# Patient Record
Sex: MALE | Race: WHITE | NOT HISPANIC OR LATINO | URBAN - METROPOLITAN AREA
[De-identification: names, ages, dates, MRNs, and addresses within clinical notes are randomized per-mention and may not be internally consistent; named-entity substitution may affect disease eponyms.]

---

## 2017-01-27 ENCOUNTER — INPATIENT (INPATIENT)
Facility: HOSPITAL | Age: 77
LOS: 9 days | Discharge: HOME HEALTH | End: 2017-02-06
Attending: ORTHOPAEDIC SURGERY

## 2017-06-27 DIAGNOSIS — T84.54XD INFECTION AND INFLAMMATORY REACTION DUE TO INTERNAL LEFT KNEE PROSTHESIS, SUBSEQUENT ENCOUNTER: ICD-10-CM

## 2017-06-27 DIAGNOSIS — Z96.643 PRESENCE OF ARTIFICIAL HIP JOINT, BILATERAL: ICD-10-CM
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
## 2017-06-27 DIAGNOSIS — N40.0 BENIGN PROSTATIC HYPERPLASIA WITHOUT LOWER URINARY TRACT SYMPTOMS: ICD-10-CM

## 2017-06-27 DIAGNOSIS — I48.91 UNSPECIFIED ATRIAL FIBRILLATION: ICD-10-CM

## 2017-06-27 DIAGNOSIS — G47.33 OBSTRUCTIVE SLEEP APNEA (ADULT) (PEDIATRIC): ICD-10-CM

## 2017-06-27 DIAGNOSIS — I10 ESSENTIAL (PRIMARY) HYPERTENSION: ICD-10-CM

## 2018-04-10 ENCOUNTER — RX RENEWAL (OUTPATIENT)
Age: 78
End: 2018-04-10

## 2018-07-20 ENCOUNTER — APPOINTMENT (OUTPATIENT)
Dept: UROLOGY | Facility: CLINIC | Age: 78
End: 2018-07-20
Payer: MEDICARE

## 2018-07-20 VITALS — SYSTOLIC BLOOD PRESSURE: 154 MMHG | DIASTOLIC BLOOD PRESSURE: 84 MMHG | HEART RATE: 76 BPM

## 2018-07-20 DIAGNOSIS — Z78.9 OTHER SPECIFIED HEALTH STATUS: ICD-10-CM

## 2018-07-20 LAB
BILIRUB UR QL STRIP: NORMAL
CLARITY UR: CLEAR
COLLECTION METHOD: NORMAL
GLUCOSE UR-MCNC: NORMAL
HCG UR QL: NORMAL EU/DL
HGB UR QL STRIP.AUTO: NORMAL
KETONES UR-MCNC: NORMAL
LEUKOCYTE ESTERASE UR QL STRIP: NORMAL
NITRITE UR QL STRIP: NORMAL
PH UR STRIP: 6
PROT UR STRIP-MCNC: NORMAL
SP GR UR STRIP: 1.01

## 2018-07-20 PROCEDURE — 81003 URINALYSIS AUTO W/O SCOPE: CPT | Mod: QW

## 2018-07-20 PROCEDURE — 99213 OFFICE O/P EST LOW 20 MIN: CPT

## 2019-01-15 ENCOUNTER — APPOINTMENT (OUTPATIENT)
Dept: PLASTIC SURGERY | Facility: CLINIC | Age: 79
End: 2019-01-15
Payer: MEDICARE

## 2019-01-15 VITALS — WEIGHT: 215 LBS | BODY MASS INDEX: 29.12 KG/M2 | HEIGHT: 72 IN

## 2019-01-15 DIAGNOSIS — M65.30 TRIGGER FINGER, UNSPECIFIED FINGER: ICD-10-CM

## 2019-01-15 DIAGNOSIS — Z87.19 PERSONAL HISTORY OF OTHER DISEASES OF THE DIGESTIVE SYSTEM: ICD-10-CM

## 2019-01-15 DIAGNOSIS — Z86.79 PERSONAL HISTORY OF OTHER DISEASES OF THE CIRCULATORY SYSTEM: ICD-10-CM

## 2019-01-15 DIAGNOSIS — M19.90 UNSPECIFIED OSTEOARTHRITIS, UNSPECIFIED SITE: ICD-10-CM

## 2019-01-15 PROCEDURE — 99203 OFFICE O/P NEW LOW 30 MIN: CPT | Mod: 25

## 2019-01-15 PROCEDURE — 20550 NJX 1 TENDON SHEATH/LIGAMENT: CPT

## 2019-01-15 NOTE — HISTORY OF PRESENT ILLNESS
[FreeTextEntry1] : 77 yo M RHD with PMH of A-Fib on Xarelto, MARGO on CPAP and multiple trigger fingers in right hand s/p release by another practitioner in [place] who presents with left thumb pain and locking for the past 6 months. Patient has been wearing a thumb spica splint with some improvement in pain but persistent locking. Denies any hand trauma. Denies any prior injection to left thumb.

## 2019-01-15 NOTE — PHYSICAL EXAM
[de-identified] : well-developed male, no distress [de-identified] : left thumb with active triggering, FROM,

## 2019-01-15 NOTE — ASSESSMENT
[FreeTextEntry1] : 79 yo M with left trigger thumb. \par \par - recommend Kenalog injection \par \par triggering left thumb--injected 5mg\par \par fili well\par \par f/u 4-6 weeks

## 2019-01-20 ENCOUNTER — FORM ENCOUNTER (OUTPATIENT)
Age: 79
End: 2019-01-20

## 2019-01-21 ENCOUNTER — OUTPATIENT (OUTPATIENT)
Dept: OUTPATIENT SERVICES | Facility: HOSPITAL | Age: 79
LOS: 1 days | Discharge: HOME | End: 2019-01-21

## 2019-01-21 DIAGNOSIS — M25.531 PAIN IN RIGHT WRIST: ICD-10-CM

## 2019-01-21 DIAGNOSIS — M25.532 PAIN IN LEFT WRIST: ICD-10-CM

## 2019-02-19 ENCOUNTER — APPOINTMENT (OUTPATIENT)
Dept: PLASTIC SURGERY | Facility: CLINIC | Age: 79
End: 2019-02-19
Payer: MEDICARE

## 2019-02-19 PROCEDURE — 99212 OFFICE O/P EST SF 10 MIN: CPT

## 2019-02-19 NOTE — ASSESSMENT
[FreeTextEntry1] : 79 yo M with left trigger thumb, resolved s/p 1 Kenalog injection Jan 2019\par -No PRS intervention at this time\par -Reviewed possible 2nd injection if triggering recurs\par \par as above\par pt doing well overall

## 2019-02-19 NOTE — HISTORY OF PRESENT ILLNESS
[FreeTextEntry1] : 79 yo M RHD with PMH of A-Fib on Xarelto, MARGO on CPAP and multiple trigger fingers (right 2nd and 5th, possible 3rd or 4th) in right hand s/p release by another practitioner in [place] who presents with left thumb pain and locking for the past 6 months. Patient has been wearing a thumb spica splint with some improvement in pain but persistent locking. Denies any hand trauma. Denies any prior injection to left thumb. \par  \par Interval hx (2/19/19): Pt presents for f/u after 1st kenalog injection to left thumb. States it stopped triggering 1 week after injection and has not recurred since. Very happy with results.

## 2019-02-19 NOTE — PHYSICAL EXAM
[de-identified] : well-developed male, no distress [de-identified] : Left thumb with palpable nontender nodule over A1 pulley, no locking or clicking, FROM

## 2019-03-06 ENCOUNTER — APPOINTMENT (OUTPATIENT)
Dept: CARDIOLOGY | Facility: CLINIC | Age: 79
End: 2019-03-06

## 2019-05-16 ENCOUNTER — APPOINTMENT (OUTPATIENT)
Dept: CARDIOLOGY | Facility: CLINIC | Age: 79
End: 2019-05-16

## 2019-06-11 ENCOUNTER — RECORD ABSTRACTING (OUTPATIENT)
Age: 79
End: 2019-06-11

## 2019-06-11 DIAGNOSIS — M25.369 OTHER INSTABILITY, UNSPECIFIED KNEE: ICD-10-CM

## 2019-06-11 DIAGNOSIS — Z87.898 PERSONAL HISTORY OF OTHER SPECIFIED CONDITIONS: ICD-10-CM

## 2019-06-11 DIAGNOSIS — I10 ESSENTIAL (PRIMARY) HYPERTENSION: ICD-10-CM

## 2019-07-11 ENCOUNTER — APPOINTMENT (OUTPATIENT)
Dept: NEUROLOGY | Facility: CLINIC | Age: 79
End: 2019-07-11
Payer: MEDICARE

## 2019-07-19 ENCOUNTER — APPOINTMENT (OUTPATIENT)
Dept: NEUROLOGY | Facility: CLINIC | Age: 79
End: 2019-07-19
Payer: MEDICARE

## 2019-07-19 VITALS
DIASTOLIC BLOOD PRESSURE: 66 MMHG | HEIGHT: 71 IN | OXYGEN SATURATION: 99 % | TEMPERATURE: 96.3 F | HEART RATE: 69 BPM | SYSTOLIC BLOOD PRESSURE: 119 MMHG | WEIGHT: 220 LBS | BODY MASS INDEX: 30.8 KG/M2

## 2019-07-19 PROCEDURE — 99214 OFFICE O/P EST MOD 30 MIN: CPT

## 2019-07-22 NOTE — ASSESSMENT
[FreeTextEntry1] : Owen is here for followup. He was seen by me first in 2011 in the context of having issues with his memory and gait. At the time he was attributing the memory issue to postsurgical changes after his knee surgery in 2009. He was also started on Vicodin at the time and is to use continue to take painkillers in that category. The striking issue at the time of initial visit buses gait but this didn't persist and the workup had confirmed the initial diagnosis of mechanical and a spinal cause. Considering the initial presentation I believe overall his maintaining level of function very reasonable level he does have significant spine disease his last MRI of the lumbar spine done in 2018 and his EMG as well.\par His memory issues had a state very much is stable and in fact he has been doing slightly better in my opinion there is no reason to believe that we are dealing with a degenerative  cognitive disease. At this point he will continue his current level of care he has a good understanding of his daily exercises. As today she is driving issues and also convenience we have discussed for him to have a [place]up [place] feet Dr. Cody. These next next appointment is made with Dr. Cody in the office. Best wishes

## 2019-07-22 NOTE — PHYSICAL EXAM
[FreeTextEntry1] : Owen is in a good mood making her very good conversation he follows for step commands crosses the midline well his language is intact I do not see significant issues retrieving from his memory he is in a good mood his thought contents are normal he is well groomed and clean.\par Cranial nerve exam is normal\par Motor exam shows that compromise and often asymmetrical weightbearing when he stands up. The motor strength is 5 over 5 his posture is scoliotic to the right with right shoulder drop and he almost limps on the left side. His gait is stable walking without cane but quite hesitant. The Romberg is negative

## 2019-07-22 NOTE — HISTORY OF PRESENT ILLNESS
[FreeTextEntry1] : Owen is here for followup. He is 79 years old first seen by me in 2011 in the context of having some issues with his memory and processing speed appropriately after starting Vicodin and after his surgery. The surgical procedure was left knee replacement in 2009.\par At the time because my impression that the history and physical exam and natural course was not suggestive of a degenerative brain disease. However the exam was significant for gait disorder that again in my assessment was not suggestive of central cause. It was my impression that it was mainly mechanical and spinal in nature.\par At the time we did do an MRI of the brain that shows minimal small vessel disease. The ambulator EEG was normal. The MRI of the spine he states his significant disease.\par His last MRI of the lumbar spine shows moderate lumbar dextral scoliosis and grade 1 L1-2 L2-3 L3-4 and L5-S1 retrolisthesis. It also shows marked diffuse degenerative changes in the lower thoracic and lumbar spine and minimal L2 she has spinal carotid stenoses and foraminal disease and moderate L3-L4 spinal canal stenoses and marked L4-L5 and spinal stenosis. This MRI was done in May 2018.\par He had done also EMGs in the past the last EMG was done in July 2018 that shows bilateral severe chronic lumbosacral radiculopathies primarily involving the L5-S1 nerve roots.\par He did receive some physical therapy and he is quite aware of the condition and with that level of understanding trying his best to be independent. Recently in 2 or 3 occasions he lost his balance mainly happening bending down and also turning suddenly. He denies having any vertigo or dizziness. His memory issues continues to be at the level that I saw him first I believe he did not change and in some aspects he is doing better. This improvement to some extent is because of getting adjusted and to learn how to leave independently for the last few years

## 2019-07-26 ENCOUNTER — APPOINTMENT (OUTPATIENT)
Dept: UROLOGY | Facility: CLINIC | Age: 79
End: 2019-07-26

## 2019-08-29 ENCOUNTER — APPOINTMENT (OUTPATIENT)
Dept: CARDIOLOGY | Facility: CLINIC | Age: 79
End: 2019-08-29
Payer: MEDICARE

## 2019-08-29 PROCEDURE — 93000 ELECTROCARDIOGRAM COMPLETE: CPT

## 2019-08-29 PROCEDURE — 99213 OFFICE O/P EST LOW 20 MIN: CPT

## 2019-10-01 ENCOUNTER — APPOINTMENT (OUTPATIENT)
Dept: UROLOGY | Facility: CLINIC | Age: 79
End: 2019-10-01
Payer: MEDICARE

## 2019-10-01 VITALS — WEIGHT: 220 LBS | BODY MASS INDEX: 30.8 KG/M2 | HEIGHT: 71 IN

## 2019-10-01 DIAGNOSIS — N13.8 BENIGN PROSTATIC HYPERPLASIA WITH LOWER URINARY TRACT SYMPMS: ICD-10-CM

## 2019-10-01 DIAGNOSIS — N40.1 BENIGN PROSTATIC HYPERPLASIA WITH LOWER URINARY TRACT SYMPMS: ICD-10-CM

## 2019-10-01 PROCEDURE — 99213 OFFICE O/P EST LOW 20 MIN: CPT

## 2019-10-01 RX ORDER — TAMSULOSIN HYDROCHLORIDE 0.4 MG/1
0.4 CAPSULE ORAL
Qty: 90 | Refills: 3 | Status: DISCONTINUED | COMMUNITY
Start: 2019-10-01 | End: 2019-10-01

## 2019-10-01 RX ORDER — TAMSULOSIN HYDROCHLORIDE 0.4 MG/1
0.4 CAPSULE ORAL
Qty: 180 | Refills: 3 | Status: DISCONTINUED | COMMUNITY
Start: 2018-04-10 | End: 2019-10-01

## 2019-10-01 NOTE — HISTORY OF PRESENT ILLNESS
[Currently Experiencing ___] :  [unfilled] [Urinary Urgency] : urinary urgency [Urinary Frequency] : urinary frequency [Nocturia] : nocturia [None] : None [FreeTextEntry1] : GENA REYNOSO is a 79 year old male with a past medical history of BPH with LUTS. Presents to the office today for a yearly follow up, last seen on 7/20/2018. Patient on Tamsulosin 0.4 mg daily and states his LUTS started approx 6 months ago. He currently has frequency, urgency, nocturia 2 x a night. Denies dysuria, gross hematuria,flank pain, fever, or chills. \par  [Urinary Incontinence] : no urinary incontinence [Urinary Retention] : no urinary retention [Straining] : no straining [Weak Stream] : no weak stream [Dysuria] : no dysuria [Hematuria - Gross] : no gross hematuria

## 2019-10-01 NOTE — ASSESSMENT
[FreeTextEntry1] : Patient has BPH wth LUTS and on Tamsulosin 0.4 mg daily.C/o increased luts. Will increase Tamsulosin to BID 0.4 g daily, medication renewed. Will F/U in 1 year, and instructed to call if any issues arise before scheduled appointment.

## 2019-11-15 ENCOUNTER — APPOINTMENT (OUTPATIENT)
Dept: ORTHOPEDIC SURGERY | Facility: CLINIC | Age: 79
End: 2019-11-15
Payer: MEDICARE

## 2019-11-15 DIAGNOSIS — Z86.79 PERSONAL HISTORY OF OTHER DISEASES OF THE CIRCULATORY SYSTEM: ICD-10-CM

## 2019-11-15 DIAGNOSIS — Z96.652 PRESENCE OF LEFT ARTIFICIAL KNEE JOINT: ICD-10-CM

## 2019-11-15 PROCEDURE — 99214 OFFICE O/P EST MOD 30 MIN: CPT

## 2019-11-15 PROCEDURE — 73562 X-RAY EXAM OF KNEE 3: CPT | Mod: LT

## 2019-11-18 NOTE — PHYSICAL EXAM
[de-identified] : Left Knee\par Inspection: no effusion\par Wounds: healed midline incision, no drainage or erythema, skin intact, no evidence of infection\par Alignment: normal.\par Palpation: no specific tenderness on palpation.\par ROM: 0-100 degrees, good stability \par Muscle Test: good quad strength.\par Leg examination: calf is soft and non-tender. [de-identified] : Radiographs done today AP lateral and skyline of the left knee shows antibiotic impregnated articulating spacer in good position with no evidence of loosening or infection.

## 2019-11-18 NOTE — HISTORY OF PRESENT ILLNESS
[de-identified] : 79 year old female s/p revision left total knee replacement and insertion of antibiotic spacer presents to the office today for his annual follow up. He is ambulating without a walker, cane or crutch today, but does report using a cane occasionally for balance. He denies any swelling, buckling, locking, clicking, or loss of motion. He reports being able to ambulate unlimited distances in regard to his knee though he feels his age slows him down sometimes. He continues to have difficulty with negotiating stairs but states he does still notice improvements. Overall, patient reports doing well with his knee.

## 2019-11-18 NOTE — ASSESSMENT
[FreeTextEntry1] : Pt is s/p removal of infected TKA with insertion of spacer IN 2017. He is currently on oral suppressive antibiotic therapy. Pt is doing well with no pain and good function and with no evidence of recurrence of infection. If there is no contraindication from the infectious disease perspective, i think consideration should be given to stopping antibiotics. He can f/u in 1 year. \par

## 2020-02-10 ENCOUNTER — APPOINTMENT (OUTPATIENT)
Dept: NEUROLOGY | Facility: CLINIC | Age: 80
End: 2020-02-10
Payer: MEDICARE

## 2020-02-10 VITALS
HEIGHT: 72 IN | SYSTOLIC BLOOD PRESSURE: 184 MMHG | TEMPERATURE: 98.3 F | OXYGEN SATURATION: 97 % | DIASTOLIC BLOOD PRESSURE: 96 MMHG | WEIGHT: 228 LBS | HEART RATE: 66 BPM | BODY MASS INDEX: 30.88 KG/M2

## 2020-02-10 PROCEDURE — 99214 OFFICE O/P EST MOD 30 MIN: CPT

## 2020-02-10 RX ORDER — DICLOXACILLIN SODIUM 250 MG/1
250 CAPSULE ORAL TWICE DAILY
Refills: 0 | Status: ACTIVE | COMMUNITY

## 2020-02-10 RX ORDER — HYDROCODONE BITARTRATE AND ACETAMINOPHEN 7.5; 3 MG/1; MG/1
7.5-3 TABLET ORAL
Refills: 0 | Status: ACTIVE | COMMUNITY

## 2020-02-10 RX ORDER — METOPROLOL TARTRATE 50 MG/1
50 TABLET, FILM COATED ORAL
Refills: 0 | Status: ACTIVE | COMMUNITY

## 2020-02-10 RX ORDER — ATORVASTATIN CALCIUM 10 MG/1
10 TABLET, FILM COATED ORAL
Refills: 0 | Status: ACTIVE | COMMUNITY

## 2020-02-10 RX ORDER — RIVAROXABAN 20 MG/1
20 TABLET, FILM COATED ORAL
Refills: 0 | Status: ACTIVE | COMMUNITY

## 2020-02-10 RX ORDER — DILTIAZEM HYDROCHLORIDE 120 MG/1
120 CAPSULE, EXTENDED RELEASE ORAL
Refills: 0 | Status: ACTIVE | COMMUNITY

## 2020-02-10 RX ORDER — OMEPRAZOLE 20 MG/1
20 CAPSULE, DELAYED RELEASE ORAL
Refills: 0 | Status: ACTIVE | COMMUNITY

## 2020-02-10 NOTE — HISTORY OF PRESENT ILLNESS
[FreeTextEntry1] : Pt is 78 yo RH male with chronic stable memory loss and c/o numbness in feet for 6-7 years.  States got to a point where couldn't move his toes, now can move them again but numbness progressive.  Has a cane but doesn't.   Has 3 grab bars in shower and elsewhere.  Doesn't have/use a shower chair.  Doesn't have throw rugs.  Has night lights up.  Exercises twice a day and falls this year happened outdoors. \par \par C/o tight feeling under his toes. Sometimes burning in right big.  Feels like the germán/lyrica combination keeps the pain at bay.  Vicodin for back pain (under care of pain management doctor Santiago).   Had spinals injections since 2006, recent RF ablation to back up to twice in a year, but back is doing better.

## 2020-02-10 NOTE — ASSESSMENT
[FreeTextEntry1] : 1.  Peripheral neuropathy, idiopathic.  Will get routine lab testing minus A1C since states had that recently.  Will return to see me in 6 months.  PT gait evaluation in interim.\par 2.  Mild cognitive impairment, feels this is progressing, only missed 1/3 on short term recall.  Will get f/u with [facility] in 2-3 months for MOCA testing.

## 2020-02-10 NOTE — PHYSICAL EXAM
[FreeTextEntry1] : Recent and remote memory, general fund of knowledge, attention, concentration, and language function were intact.  Pupils are equal, round and reactive to light and accommodation.  Funduscopic exam did not show any papilledema.  Visual fields are intact to confrontation.  Extraocular movements are full.  There is no nystagmus.  The facial muscles are symmetric.  Facial sensation is intact to light touch, temperature, and pinprick.  Hearing is intact to finger rub bilaterally.  Tongue is midline.  Palate elevates symmetrically.  Neck is supple.  There is no meningismus.  Shoulder shrugs are symmetric.  Motor exam demonstrates 5/5 strength in the proximal and distal muscles of the upper and lower extremities, some weakness in toe flexion/extension.  Tone and bulk were normal.  There is no pronator drift.  Reflexes are 2+ bilaterally in the biceps, triceps, brachioradialis, absent in patellae and ankles.  Toes are downgoing.  There is no clonus.  Coordination demonstrates normal finger-to-nose, heel-to-shin and rapid alternating movements.  Gait demonstrates wide base, unable to walk on toes,  able to take a few steps on his heels. Tandem gait is very unsteady, unable to perform without examiner holding on.  Sensory exam, stocking loss to LT, PP, cold, vibration below knees bilaterally.\par \par The patient is well-developed, well-nourished male in no acute distress.  Cardiac exam demonstrates a regular rate and rhythm.  No murmurs.  Carotids are 2+ bilaterally.  No bruits.  Abdomen is soft, nontender, and nondistended.  Bowel sounds are present.  Extremities showed no clubbing, cyanosis or edema. \par \par \par

## 2020-02-10 NOTE — REVIEW OF SYSTEMS
[Memory Lapses or Loss] : memory loss [Decr. Concentrating Ability] : decreased concentrating ability [Hand Weakness] :  hand weakness [Numbness] : numbness [Tingling] : tingling [Difficulty Walking] : difficulty walking [Frequent Falls] : frequent falls [Eyesight Problems] : eyesight problems [Loss Of Hearing] : hearing loss [Shortness Of Breath] : shortness of breath [SOB on Exertion] : shortness of breath during exertion [Joint Swelling] : joint swelling [Joint Pain] : joint pain [Joint Stiffness] : joint stiffness [Chills] : no chills [Fever] : no fever [Suicidal] : not suicidal [Recent Weight Gain (___ Lbs)] : no recent weight gain [Recent Weight Loss (___ Lbs)] : no recent weight loss [Sleep Disturbances] : no sleep disturbances [Depression] : no depression [Anxiety] : no anxiety [Facial Weakness] : no facial weakness [Arm Weakness] : no arm weakness [Leg Weakness] : no leg weakness [Dizziness] : no dizziness [Seizures] : no convulsions [Lightheadedness] : no lightheadedness [Fainting] : no fainting [Vertigo] : no vertigo [Cluster Headache] : no cluster headache [Migraine Headache] : no migraine headache [Eye Pain] : no eye pain [Tension Headache] : no tension-type headache [Earache] : no earache [Heart Rate Is Slow] : the heart rate was not slow [Heart Rate Is Fast] : the heart rate was not fast [Chest Pain] : no chest pain [Palpitations] : no palpitations [Wheezing] : no wheezing [Cough] : no cough [Abdominal Pain] : no abdominal pain [Vomiting] : no vomiting [Constipation] : no constipation [Melena] : no melena [Heartburn] : no heartburn [Diarrhea] : no diarrhea [Incontinence] : no incontinence [Dysuria] : no dysuria [Skin Wound] : no skin wound [Hot Flashes] : no hot flashes [Skin Lesions] : no skin lesions [Easy Bleeding] : no tendency for easy bleeding [Easy Bruising] : no tendency for easy bruising [Muscle Weakness] : no muscle weakness [FreeTextEntry3] : macular degeneration  [de-identified] : feet numb and tingling [FreeTextEntry4] : wears hearing marita

## 2020-03-09 LAB
FOLATE SERPL-MCNC: >20 NG/ML
TSH SERPL-ACNC: 2.95 UIU/ML
VIT B12 SERPL-MCNC: 1593 PG/ML

## 2020-03-10 LAB
ALBUMIN MFR SERPL ELPH: 56.8 %
ALBUMIN SERPL-MCNC: 4.1 G/DL
ALBUMIN/GLOB SERPL: 1.3 RATIO
ALPHA1 GLOB MFR SERPL ELPH: 3.6 %
ALPHA1 GLOB SERPL ELPH-MCNC: 0.3 G/DL
ALPHA2 GLOB MFR SERPL ELPH: 9.4 %
ALPHA2 GLOB SERPL ELPH-MCNC: 0.7 G/DL
B-GLOBULIN MFR SERPL ELPH: 13.1 %
B-GLOBULIN SERPL ELPH-MCNC: 1 G/DL
DEPRECATED KAPPA LC FREE/LAMBDA SER: 1.63 RATIO
GAMMA GLOB FLD ELPH-MCNC: 1.2 G/DL
GAMMA GLOB MFR SERPL ELPH: 17.1 %
IGA SER QL IEP: 408 MG/DL
IGG SER QL IEP: 1347 MG/DL
IGM SER QL IEP: 71 MG/DL
INTERPRETATION SERPL IEP-IMP: NORMAL
KAPPA LC CSF-MCNC: 1.67 MG/DL
KAPPA LC SERPL-MCNC: 2.72 MG/DL
M PROTEIN SPEC IFE-MCNC: NORMAL
PROT SERPL-MCNC: 7.3 G/DL

## 2020-05-12 ENCOUNTER — APPOINTMENT (OUTPATIENT)
Dept: NEUROLOGY | Facility: CLINIC | Age: 80
End: 2020-05-12

## 2020-06-02 ENCOUNTER — TRANSCRIPTION ENCOUNTER (OUTPATIENT)
Age: 80
End: 2020-06-02

## 2020-06-02 ENCOUNTER — APPOINTMENT (OUTPATIENT)
Dept: NEUROLOGY | Facility: CLINIC | Age: 80
End: 2020-06-02
Payer: MEDICARE

## 2020-06-02 PROCEDURE — 99215 OFFICE O/P EST HI 40 MIN: CPT | Mod: 95

## 2020-06-05 ENCOUNTER — APPOINTMENT (OUTPATIENT)
Dept: NEUROLOGY | Facility: CLINIC | Age: 80
End: 2020-06-05

## 2020-08-14 ENCOUNTER — APPOINTMENT (OUTPATIENT)
Dept: NEUROLOGY | Facility: CLINIC | Age: 80
End: 2020-08-14
Payer: MEDICARE

## 2020-08-14 VITALS
OXYGEN SATURATION: 97 % | DIASTOLIC BLOOD PRESSURE: 78 MMHG | HEART RATE: 55 BPM | TEMPERATURE: 97.8 F | BODY MASS INDEX: 30.88 KG/M2 | WEIGHT: 228 LBS | SYSTOLIC BLOOD PRESSURE: 146 MMHG | HEIGHT: 72 IN

## 2020-08-14 PROCEDURE — 99215 OFFICE O/P EST HI 40 MIN: CPT

## 2020-08-14 NOTE — PHYSICAL EXAM
[FreeTextEntry1] : Patient's speech was fluent, facial muscles were symmetric.\par Strength 5/5 in upper and lower extremities except for difficulty with hip flexion bilaterally worse on right.\par Has stocking sensory loss in LE's, absent vibration in feet.\par Reflexes present 1+ upper extremities, absent in knees and ankles.\par Finger to nose intact bilaterally.\par Normal blink rate.\par Normal finger tapping amplitude.\par No tremor.\par Normal finger to nose bilaterally.\par \par Ambulates with wide based gait using SPC.\par No festination.

## 2020-08-14 NOTE — HISTORY OF PRESENT ILLNESS
[FreeTextEntry1] : Pt presents for f/u of neuropathy and mild cognitive impairment.  States for memory the donepezil seemed to help a little, dose was increase to 5 mg twice day.  Still struggles somewhat with sleep, wears CPAP, overall happy with sleep.  Thinks if has fatty foods at night interferes.  \par Had MOCA v1 test in June and score seems 26/30 (-2 on short term recall and -1 for cube and -1 for short and long hand of clock reversed).\par \par States get PT on regular basis and feels like he is making progress, "catching himself stronger not quicker" feels PT has helped tremendously.  Only fell once this year prior February appointment.  Has SPC uses outside.  No rugs in house, hand bars "all over the place".  Doesn't have a shower chair but has 3 grab bars in the shower.\par \par Labs : B12/foate not low, TSH normal.  Immunoelectrophorese no monoclonocal protein.\par \par Patient wonders if he has another cause for imbalance other than the numbness in feet.  Denies vertigo.\par \par Had EMG/NCS done by Dr. Wallace on 7/9/18 showing "bilateral severe, chronic lumbosacral radiculopathies which primarily involve the L5 and S1 nerve roots."  On this study sural nerve responses were obtained.\par \par MRI lumbar spine w/o 5/18/18 showed " Moderate lumbar dextroscoliosis.  Grade 1 L1-2, L2-3, L3-4, L5-S1 retrolishthesis.  Grade 1 L4-5 spondylolisthesis.  Marked diffuse degenerative lower thoracic, lumbar disc change.  Diffuse degenerative facet disease.  Minimal L2-3 spinal canal narrowing.  Right L2-3 foraminal disc extension which abuts the right L2 nerve root.  Moderate L3-4 spinal canal stenosis.  Right L3-4 foraminal disc extension which almost abuts the right L3 nerve root.  Grade 1 L4-5 spondylolisthesis.  Marked L4-5 spinal canal stenosis.  Bilateral foraminal disc extension which abuts the L5-S1 disc bulge which abuts the right S1 nerve root.  Bilateral L5-S1 foraminal narrowing which abuts the L5 nerve roots.  Compared to 7/15, degenerative disc dessication at L1-2 progressive.  Degenerative change/spinal canal narrowing at L2-3 is new.  Spinal canal narrowing at L3-4 is progressive.  Spinal canal narrowing at L4-5 is perhaps marginally progressive. No significant change otherwise. "\par \par EMG: Dr. Collado 10/25/15: \par "Moderate to severe neuropathic process with predominantly axonal features.  Comparison with prior study of March 2014 reveals no significant interval change."  Sural nerve responses were absent.\par \par EMG:  Dr. Collado 3/3/14\par "Limited study.  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  Minimal microvascular ischemic changes commonly seen in this age.  Age-related parenchymal volume loss."\par

## 2020-08-14 NOTE — ASSESSMENT
[FreeTextEntry1] : 1.  Ambulatory difficulty due to sensory loss in LE's likely combination of polyneuropathy and lumbar spine disease although puzzling why sural responses obtained on last EMG.  Pt not enthusiastic about repeating another EMG.  He doesn't want any spinal surgery.  No recent falls and is benefiting from PT.\par 2.  Mild cognitive impairment, may benefit from increase in donepezil to 23 mg and being given all in am with breakfast.  I sent in script.\par 3.  Return in 6 months.

## 2020-10-02 ENCOUNTER — APPOINTMENT (OUTPATIENT)
Dept: UROLOGY | Facility: CLINIC | Age: 80
End: 2020-10-02
Payer: MEDICARE

## 2020-10-02 VITALS — TEMPERATURE: 97.6 F | BODY MASS INDEX: 30.88 KG/M2 | HEIGHT: 72 IN | WEIGHT: 228 LBS

## 2020-10-02 DIAGNOSIS — N40.1 BENIGN PROSTATIC HYPERPLASIA WITH LOWER URINARY TRACT SYMPMS: ICD-10-CM

## 2020-10-02 DIAGNOSIS — N13.8 BENIGN PROSTATIC HYPERPLASIA WITH LOWER URINARY TRACT SYMPMS: ICD-10-CM

## 2020-10-02 PROCEDURE — 99213 OFFICE O/P EST LOW 20 MIN: CPT

## 2020-10-02 NOTE — ASSESSMENT
[FreeTextEntry1] : Stable BPH and improved lower urinary tract symptoms since cutting down on caffeine.

## 2020-10-02 NOTE — REVIEW OF SYSTEMS
[Fever] : no fever [Chest Pain] : no chest pain [Shortness Of Breath] : no shortness of breath [Constipation] : no constipation [Dysuria] : no dysuria [Skin Lesions] : no skin lesions [Confused] : no confusion

## 2020-10-02 NOTE — HISTORY OF PRESENT ILLNESS
[FreeTextEntry1] : E1-year-old with BPH, lower urinary tract symptoms. Since last visit he has decreased his caffeinated products and currently has nocturia down to one which is improved. There is no dysuria, no gross hematuria, no flank pain, no fever, no chills. He is reducing the evening water intake also. He continues on tamsulosin 0.4 daily.

## 2020-12-09 ENCOUNTER — APPOINTMENT (OUTPATIENT)
Dept: ORTHOPEDIC SURGERY | Facility: CLINIC | Age: 80
End: 2020-12-09
Payer: MEDICARE

## 2020-12-09 DIAGNOSIS — Z89.529: ICD-10-CM

## 2020-12-09 PROCEDURE — 73564 X-RAY EXAM KNEE 4 OR MORE: CPT | Mod: 50

## 2020-12-09 PROCEDURE — 99214 OFFICE O/P EST MOD 30 MIN: CPT

## 2020-12-09 NOTE — PHYSICAL EXAM
[de-identified] : Left Knee\par Inspection: no effusion, skin intact \par Wounds: healed midline incision, no drainage or erythema\par Alignment: normal.\par Palpation: no specific tenderness on palpation.\par ROM: 0-100 degrees, good stability \par Muscle Test: good quad strength.\par Leg examination: calf is soft and non-tender.\par  [de-identified] : Radiographs done today AP lateral and skyline of both knees shows the bony structures are intact , he has in place an articulating spacer, there is a all ALL poly tibia and a revision femur with a long cemented stem and a well aligned un resurfaced patella.

## 2020-12-09 NOTE — ASSESSMENT
[FreeTextEntry1] : Pt is here for his annual visit. He is s/p removal of infected  left TKA with insertion of antibiotic pregnanted articulating spacer in 2017 , and s/p RTK with another surgeon 16 years ago. He is still on oral suppressive antibiotic. There has been no recurrence. The knee is functioning well, he has no pain, excellent ROM and stability. He ambulates with a cane for balance. He is under the care of Dr Mancini , taking dicloxacillin 2x a day. We will continue the present course of action and see him back in 1 year.

## 2020-12-09 NOTE — HISTORY OF PRESENT ILLNESS
[de-identified] : 80 year old male s/p right total knee replacement with another surgeon and revision left total knee replacement, removal of prosthesis and insertion of antibiotic spacer presents to the office today for a follow up. Patient is on chronic antibiotic suppressive therapy twice daily, decreased from three times daily as per his ID doctor. Patient denies any pain in either knee. He reports doing well with ambulation usually walking about one mile daily. He also reports doing well with negotiating stairs. He denies taking any medications for pain in his knees at this point. Overall, patient reports doing well.

## 2021-03-12 ENCOUNTER — APPOINTMENT (OUTPATIENT)
Dept: NEUROLOGY | Facility: CLINIC | Age: 81
End: 2021-03-12
Payer: MEDICARE

## 2021-03-12 VITALS
OXYGEN SATURATION: 96 % | HEIGHT: 72 IN | TEMPERATURE: 97.9 F | DIASTOLIC BLOOD PRESSURE: 76 MMHG | SYSTOLIC BLOOD PRESSURE: 127 MMHG | HEART RATE: 71 BPM | BODY MASS INDEX: 30.88 KG/M2 | WEIGHT: 228 LBS

## 2021-03-12 PROCEDURE — 99213 OFFICE O/P EST LOW 20 MIN: CPT

## 2021-03-12 NOTE — HISTORY OF PRESENT ILLNESS
[FreeTextEntry1] : Pt seen in f/u for MCI and neuropathy.  No falls since last seen.  With increase in donepezil to 23 mg notices improvement in memory.   States neigbor even noticed it too.\par \par C/o of tight feeling in lower extremities.

## 2021-03-12 NOTE — ASSESSMENT
[FreeTextEntry1] : 1.  Mild cognitive impairment stable.  Some improvement on donepezil 23 mg.\par 2.  Neuropathy and lumbar spine disease, stable.\par 3.  New? peripheral edema in lower extremities.  Patient advised to discuss with PCP whom he will be seeing in a few days.\par 4.  Return in 6 months.

## 2021-03-12 NOTE — PHYSICAL EXAM
[FreeTextEntry1] : 5/5 upper and lower extremities.\par Stocking sensory loss present to LT.\par Speech is fluent, no dysarthria.\par \par March 10 or 11 (12th)\par apple, table, ki\par \par [id number] (-2/5)\par \par recalls 3/3.\par \par 1+ pitting edema in lower extremities (states this is new).\par

## 2021-05-15 ENCOUNTER — APPOINTMENT (OUTPATIENT)
Dept: PULMONOLOGY | Facility: CLINIC | Age: 81
End: 2021-05-15
Payer: MEDICARE

## 2021-05-15 VITALS
OXYGEN SATURATION: 97 % | SYSTOLIC BLOOD PRESSURE: 124 MMHG | RESPIRATION RATE: 14 BRPM | DIASTOLIC BLOOD PRESSURE: 80 MMHG | HEART RATE: 88 BPM

## 2021-05-15 PROCEDURE — 99213 OFFICE O/P EST LOW 20 MIN: CPT

## 2021-05-15 NOTE — ASSESSMENT
[FreeTextEntry1] : PEriodic limb movement disorder possible\par The patient is benefitting from the CPAP.\par I reviewed all the previous sleep test that are available on file.\par \par \par PLAN:\par the pt will think about a new test for the dx. He will call me.\par New supplies ordered\par Weight loss\par Stress the need maintain compliance  with the CPAP.\par F/U in 6 months\par \par

## 2021-05-15 NOTE — HISTORY OF PRESENT ILLNESS
[Worsening Symptoms] : worsening symptoms of [Excessive Daytime Sleepiness] : excessive daytime sleepiness [Unrefreshing Sleep] : unrefreshing sleep [Restless Legs] : restless legs [Currently Experiencing] : The patient is currently experiencing symptoms. [CPAP] : CPAP [Good Compliance] : good compliance with treatment [Good Tolerance] : good tolerance of treatment [Fair Symptom Control] : fair symptom control [de-identified] : has AM leg cramping

## 2021-09-09 ENCOUNTER — APPOINTMENT (OUTPATIENT)
Dept: NEUROLOGY | Facility: CLINIC | Age: 81
End: 2021-09-09
Payer: MEDICARE

## 2021-09-09 VITALS
WEIGHT: 228 LBS | OXYGEN SATURATION: 97 % | HEIGHT: 72 IN | TEMPERATURE: 97 F | BODY MASS INDEX: 30.88 KG/M2 | SYSTOLIC BLOOD PRESSURE: 177 MMHG | DIASTOLIC BLOOD PRESSURE: 73 MMHG | HEART RATE: 73 BPM

## 2021-09-09 PROCEDURE — 99214 OFFICE O/P EST MOD 30 MIN: CPT

## 2021-09-09 NOTE — ASSESSMENT
[FreeTextEntry1] : 1. Mild cognitive impairment, stable on donepezil 23 mg/day.\par 2. Peripheral neuropathy slowly progressive, causing impaired gait and distal LE discomfort.\par 3. Elevated kappa light chains.  Needs repeat SPEP/Mariano and Hematology referral.\par \par Plan:\par 1.  Continue donepezil 23 mg/day.\par 2.  Continue falls precautions and use of SPC.\par 3.  Recheck SPEP/MARIANO.  Also check iron/TIBC, ferritin.\par 4.  Hematology referral for elevated kappa light chains.\par 5.  Return in 6 months.

## 2021-09-09 NOTE — PHYSICAL EXAM
[FreeTextEntry1] : Stocking sensory loss to LT, PP, Vib to knees.\par Weakness in left foot DF and eversion.  also weakness with bilateral toe flexion/extension\par Gait wide based but steady with single point cane.\par \par Speech is fluent. No difficulty in conversation or discussing medical concerns.

## 2021-09-09 NOTE — HISTORY OF PRESENT ILLNESS
[FreeTextEntry1] : Pt presents for f/u of neuropathy and MCI.  STates no falls in 2021 since using his single point cane.  \par States feels like his memory is improving a bit since on donepezil 23 mg/day - states doesn't get stymied in conversations anymore.  Denies side effects.    Feet are burning and painful.  Tries to flex toes but feels some weakness in toes R > L.  \par \par

## 2021-10-08 ENCOUNTER — APPOINTMENT (OUTPATIENT)
Dept: UROLOGY | Facility: CLINIC | Age: 81
End: 2021-10-08

## 2021-12-04 ENCOUNTER — APPOINTMENT (OUTPATIENT)
Age: 81
End: 2021-12-04
Payer: MEDICARE

## 2021-12-04 VITALS
BODY MASS INDEX: 30.61 KG/M2 | HEIGHT: 72 IN | HEART RATE: 72 BPM | RESPIRATION RATE: 14 BRPM | OXYGEN SATURATION: 97 % | WEIGHT: 226 LBS | SYSTOLIC BLOOD PRESSURE: 140 MMHG | DIASTOLIC BLOOD PRESSURE: 74 MMHG

## 2021-12-04 PROCEDURE — 99214 OFFICE O/P EST MOD 30 MIN: CPT

## 2021-12-04 NOTE — REASON FOR VISIT
[Follow-Up] : a follow-up visit [Sleep Apnea] : sleep apnea [Obesity] : obesity [TextBox_44] : Patient has a history of obstructive sleep apnea.  Overall he is doing very well.  However he is still a little sleepy during the day.  Given his age he is having aches and pains and is restless because of that.  He does take little catnaps throughout the day and needs help.  He is not overly disturbed by the small amount of tiredness he is experiencing.\par \par Uses his CPAP nightly and is benefiting from it.  He is up-to-date on his supplies.

## 2021-12-04 NOTE — ASSESSMENT
[FreeTextEntry1] : A:\par MARGO\par Obesity\par \par PLAN:\par I discussed with the patient the need to repeat his sleep test if he were going to try to pursue improvement in his sleep quality.  He states that he is an old man and is doing very well at present and he does not wish to do testing at this point.\par The patient is benefitting from the PAP device .\par New supplies ordered \par Weight loss discussed\par I stressed the need maintain compliance  with the PAP device.\par The patient is not to use an Ozone or UV sterilizer. \par F/U in 6 months\par \par The patient has been using another PAP unit that is not under recall.\par

## 2021-12-08 ENCOUNTER — APPOINTMENT (OUTPATIENT)
Dept: ORTHOPEDIC SURGERY | Facility: CLINIC | Age: 81
End: 2021-12-08
Payer: MEDICARE

## 2021-12-08 DIAGNOSIS — Z96.652 PRESENCE OF LEFT ARTIFICIAL KNEE JOINT: ICD-10-CM

## 2021-12-08 DIAGNOSIS — Z96.651 PRESENCE OF RIGHT ARTIFICIAL KNEE JOINT: ICD-10-CM

## 2021-12-08 PROCEDURE — 99214 OFFICE O/P EST MOD 30 MIN: CPT

## 2021-12-08 PROCEDURE — 73564 X-RAY EXAM KNEE 4 OR MORE: CPT | Mod: LT

## 2021-12-08 NOTE — HISTORY OF PRESENT ILLNESS
[de-identified] : 10/9/2020 - 80 year old male s/p right total knee replacement with another surgeon and revision left total knee replacement, removal of prosthesis and insertion of antibiotic spacer presents to the office today for a follow up. Patient is on chronic antibiotic suppressive therapy twice daily, decreased from three times daily as per his ID doctor. Patient denies any pain in either knee. He reports doing well with ambulation usually walking about one mile daily. He also reports doing well with negotiating stairs. He denies taking any medications for pain in his knees at this point. Overall, patient reports doing well.\par \par 10/8/2021 - 81 year old male s/p right total knee replacement and revision left total knee replacement, removal of prosthesis and insertion of antibiotic spacer presents to the office today for a follow up. Patient continues to be on antibiotic suppressive therapy as per his infectious disease doctor Dr. Mancini. Patient denies any pain in his right total knee or left knee. Patient denies taking any meds for pain in his knees. Overall, he reports doing well.

## 2021-12-08 NOTE — PHYSICAL EXAM
[de-identified] : Left Knee\par Inspection: no effusion, skin intact \par Wounds: healed midline incision, no drainage or erythema\par Alignment: normal.\par Palpation: no specific tenderness on palpation.\par ROM: 0-100 degrees, good stability \par Muscle Test: good quad strength.\par Leg examination: calf is soft and non-tender.\par  [de-identified] : Radiographs done today AP lateral and skyline of both knees shows the bony structures are intact , he has in place an articulating spacer, there is a all ALL poly tibia and a revision femur with a long cemented stem and a well aligned un resurfaced patella.

## 2022-03-23 ENCOUNTER — RX RENEWAL (OUTPATIENT)
Age: 82
End: 2022-03-23

## 2022-05-12 ENCOUNTER — APPOINTMENT (OUTPATIENT)
Dept: NEUROLOGY | Facility: CLINIC | Age: 82
End: 2022-05-12
Payer: MEDICARE

## 2022-05-12 VITALS
WEIGHT: 226 LBS | BODY MASS INDEX: 30.61 KG/M2 | SYSTOLIC BLOOD PRESSURE: 146 MMHG | DIASTOLIC BLOOD PRESSURE: 83 MMHG | TEMPERATURE: 97 F | OXYGEN SATURATION: 98 % | HEIGHT: 72 IN | HEART RATE: 73 BPM

## 2022-05-12 DIAGNOSIS — D89.89 OTHER SPECIFIED DISORDERS INVOLVING THE IMMUNE MECHANISM, NOT ELSEWHERE CLASSIFIED: ICD-10-CM

## 2022-05-12 PROCEDURE — 99214 OFFICE O/P EST MOD 30 MIN: CPT

## 2022-05-13 ENCOUNTER — OUTPATIENT (OUTPATIENT)
Dept: OUTPATIENT SERVICES | Facility: HOSPITAL | Age: 82
LOS: 1 days | Discharge: HOME | End: 2022-05-13
Payer: MEDICARE

## 2022-05-13 PROBLEM — D89.89 KAPPA LIGHT CHAIN DISEASE: Status: ACTIVE | Noted: 2021-09-09

## 2022-05-13 PROCEDURE — 95811 POLYSOM 6/>YRS CPAP 4/> PARM: CPT | Mod: 26

## 2022-05-13 NOTE — HISTORY OF PRESENT ILLNESS
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 Helped conversations but still having trouble and anxious with conversations because he can't say what he is thinking of.  \par \par No help with ADL's.  He does have trouble getting socks on without sock device (plastic with rope on it).\par \par States had a fall 1.5 weeks ago.  He was trying to pull garbage up the driveway and at a bad angle, hit him in the leg and went over.  Has a SPC he uses all the time except when in grocery store with a cart.  In the house every once in a while will go backwards or forwards.  He has 4 canes - one in car, one in basement, one upstairs, one downstairs.\par \par States another neurologist started him on Lyrica 100 mg twice a day ([facility]) and gabapentin 300 mg q8h.\par \par

## 2022-05-13 NOTE — PHYSICAL EXAM
[FreeTextEntry1] : wide based station and gait even with cane.\par stocking sensory loss to LT, PP, vibration in LE's ( trace vibration right ankle, absent in left ankle).\par \par 3/5 in hip flexors bilaterally\par 5/5 knee flexion/extension\par 5/5 right foot DF, 4/5 left foot DF.\par \par apple, cable, ki (2/3)  -1\par \par [id number] (-1)- 70 - 63  (-1/5)\par \par \par

## 2022-05-13 NOTE — ASSESSMENT
[FreeTextEntry1] : Impression:\par 1.  Polyneuropathy associated with kappa light chains.  Neuropathy stable.  Needs f/u with hematology.\par 2.  Proximal muscle weakness may be related to lumbar radiculopathy, spinal stenosis.  \par 3.  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  Untreated MARGO is likely worsening symptoms.\par \par Plan:\par 1.  Continue symptomatic treatment for polyneuropathy.\par 2.  Hematology referral for elevated kappa-light chains.\par 3.  F/u pulmonary for treatment of MARGO.\par 4.  Discussed lumbar radiculopathy.  He did not want surgical intervention.\par 5.  Return in 6 months.\par

## 2022-05-16 DIAGNOSIS — G47.33 OBSTRUCTIVE SLEEP APNEA (ADULT) (PEDIATRIC): ICD-10-CM

## 2022-05-21 ENCOUNTER — APPOINTMENT (OUTPATIENT)
Age: 82
End: 2022-05-21

## 2022-06-16 ENCOUNTER — RX RENEWAL (OUTPATIENT)
Age: 82
End: 2022-06-16

## 2022-06-19 ENCOUNTER — TRANSCRIPTION ENCOUNTER (OUTPATIENT)
Age: 82
End: 2022-06-19

## 2022-06-24 ENCOUNTER — APPOINTMENT (OUTPATIENT)
Dept: HEMATOLOGY ONCOLOGY | Facility: CLINIC | Age: 82
End: 2022-06-24

## 2022-06-24 ENCOUNTER — OUTPATIENT (OUTPATIENT)
Dept: OUTPATIENT SERVICES | Facility: HOSPITAL | Age: 82
LOS: 1 days | Discharge: HOME | End: 2022-06-24

## 2022-06-24 VITALS
DIASTOLIC BLOOD PRESSURE: 65 MMHG | SYSTOLIC BLOOD PRESSURE: 127 MMHG | BODY MASS INDEX: 31.02 KG/M2 | TEMPERATURE: 97.1 F | RESPIRATION RATE: 14 BRPM | WEIGHT: 229 LBS | HEIGHT: 72 IN | HEART RATE: 80 BPM

## 2022-06-24 DIAGNOSIS — R77.8 OTHER SPECIFIED ABNORMALITIES OF PLASMA PROTEINS: ICD-10-CM

## 2022-06-24 PROCEDURE — 99204 OFFICE O/P NEW MOD 45 MIN: CPT

## 2022-06-24 NOTE — REVIEW OF SYSTEMS
[Recent Change In Weight] : ~T no recent weight change [Difficulty Walking] : difficulty walking [Negative] : Allergic/Immunologic [de-identified] : Stable Numbness in feet, memory issues

## 2022-06-24 NOTE — CONSULT LETTER
[Dear  ___] : Dear  [unfilled], [Consult Letter:] : I had the pleasure of evaluating your patient, [unfilled]. [Please see my note below.] : Please see my note below. [Consult Closing:] : Thank you very much for allowing me to participate in the care of this patient.  If you have any questions, please do not hesitate to contact me. [Sincerely,] : Sincerely, [FreeTextEntry3] : Edison

## 2022-06-24 NOTE — HISTORY OF PRESENT ILLNESS
[de-identified] : CC: Abnormal blood work \par \par He is here at the request of Quirino Meyer\par \par This is an 81 year old male with history of   Polyneuropathy,  lumbar radiculopathy, spinal stenosis. He has memory issues,  In 2020 he had  blood work done In order to identify the cause of polyneuropathy.  It included a TSH which was 2.95, B12 and folate which were in the normal range although B12 was elevated.  He also had an immunoelectrophoresis that showed a normal SPEP and normal serum immunofixation and kappa of 2.72 and lambda of 1.67 with a ratio of 1.63 which is normal.   Normal immunoglobulins . this was done in March 2020\par \par He then saw him in May 2022 and his neuropathy has been stable he underwent repeat testing including immunoglobulins free light chain which was now 3.63 and essentially with the same ratio.\par \par Of the blood work that I have available from 2020, March shows a normal CBC, and ESR of 11, creatinine was 1 calcium was 9.2 albumin was 4.3.\par \par He also had blood work in [facility] creatinine was 0.9 urine albumin creatinine ratio was normal, rest of CMP was normal. CBC was normal this was on 7/2021\par \par He has an  XR of risht in 2019: diffuse osteopniea was seen.\par \par His neuropathy was diagnosed  10 years ago that effects buttom of both feet, he has no sensation and states it has been improving and is on Lyrica and gabapentin. He feels well otherwise.

## 2022-06-24 NOTE — ASSESSMENT
[FreeTextEntry1] : #Slightly elevated kappa\par -There is no M protein present on SPEP, immunofixation was negative, free light chain ratio is normal, he has no CRAB sign or symptoms and his neuropathy has actually improved, and repeat kappa light chain which is slightly increased and patient still continues to have a normal light chain ratio. CBC, CMP have been normal. \par -Given the above findings I do not believe he has a plasma cell disorder nor this kappa elevation is the cause of his neuropathy, It is  unlikely he has AL amyloidosis for the last 10 years as the cause of his neuropathy or any other plasma cell dyscrasia given the stability of his Kappa over 2 years. .  \par -In my opinion there is no indication for a bone marrow biopsy or fat pad biopsy and unlikely biopsy in the sural nerve to look for kappa deposition would be meaningful but I would defer this decision to you\par -I also would not recheck his paraproteins unless he develops new signs or symptoms concerning for plasma cell dyscrasia.

## 2022-06-24 NOTE — PHYSICAL EXAM
[Restricted in physically strenuous activity but ambulatory and able to carry out work of a light or sedentary nature] : Status 1- Restricted in physically strenuous activity but ambulatory and able to carry out work of a light or sedentary nature, e.g., light house work, office work [Normal] : affect appropriate [de-identified] : Uses a cane

## 2022-06-27 DIAGNOSIS — R77.8 OTHER SPECIFIED ABNORMALITIES OF PLASMA PROTEINS: ICD-10-CM

## 2022-07-02 ENCOUNTER — APPOINTMENT (OUTPATIENT)
Age: 82
End: 2022-07-02

## 2022-07-02 ENCOUNTER — RX RENEWAL (OUTPATIENT)
Age: 82
End: 2022-07-02

## 2022-07-02 VITALS
WEIGHT: 229 LBS | OXYGEN SATURATION: 97 % | BODY MASS INDEX: 31.02 KG/M2 | DIASTOLIC BLOOD PRESSURE: 78 MMHG | RESPIRATION RATE: 14 BRPM | HEART RATE: 73 BPM | SYSTOLIC BLOOD PRESSURE: 122 MMHG | HEIGHT: 72 IN

## 2022-07-02 PROCEDURE — 99214 OFFICE O/P EST MOD 30 MIN: CPT

## 2022-07-02 NOTE — ASSESSMENT
[FreeTextEntry1] : Assessment:\par MARGO\par Obesity\par \par PLAN:\par The patient is benefitting from the PAP device .\par New supplies ordered \par Weight loss discussed\par I stressed the need maintain compliance  with the PAP device.\par The patient is not to use an Ozone or UV sterilizer. \par F/U in 4 months\par The patient needs reduction in his sedating medications.  He has been to speak to his other physicians.  He is not having any significant pain and he feels like his neuropathy is under control.  It seems that they should be able to reduce his medications at this point.\par

## 2022-07-02 NOTE — REASON FOR VISIT
[Follow-Up] : a follow-up visit [Sleep Apnea] : sleep apnea [Obesity] : obesity [TextBox_44] : Still having excessive sleepiness during the day.  We repeated his CPAP titration which failed to reveal any dramatic changes.  He slept about 80% of the night though was a little restless.  CPAP pressure is about the same.  Of note he is taking a lot of somnolence inducing medications including Oxy contin, Lyrica, and 900 mg of gabapentin his cardiac medications may also contribute.  I feel that these should all be reduced.

## 2022-10-08 LAB
CREAT SERPL-MCNC: 1.1 MG/DL
DEPRECATED KAPPA LC FREE/LAMBDA SER: 1.61 RATIO
EGFR: 67 ML/MIN/1.73M2
KAPPA LC CSF-MCNC: 2.26 MG/DL
KAPPA LC SERPL-MCNC: 3.63 MG/DL
TSH SERPL-ACNC: 2.98 UIU/ML

## 2022-11-17 ENCOUNTER — APPOINTMENT (OUTPATIENT)
Age: 82
End: 2022-11-17

## 2022-11-17 VITALS
WEIGHT: 228 LBS | HEIGHT: 72 IN | RESPIRATION RATE: 14 BRPM | BODY MASS INDEX: 30.88 KG/M2 | SYSTOLIC BLOOD PRESSURE: 120 MMHG | OXYGEN SATURATION: 96 % | HEART RATE: 74 BPM | DIASTOLIC BLOOD PRESSURE: 74 MMHG

## 2022-11-17 DIAGNOSIS — E66.9 OBESITY, UNSPECIFIED: ICD-10-CM

## 2022-11-17 PROCEDURE — 99214 OFFICE O/P EST MOD 30 MIN: CPT

## 2022-11-17 NOTE — REASON FOR VISIT
[Follow-Up] : a follow-up visit [Sleep Apnea] : sleep apnea [Obesity] : obesity [TextBox_44] : Doing well not having any issues.  Compliant with CPAP

## 2022-11-17 NOTE — ASSESSMENT
[FreeTextEntry1] : Assessment:\par MARGO\par Obesity\par \par PLAN:\par The patient is benefitting from the PAP device .\par New supplies ordered \par Weight loss discussed\par I stressed the need maintain compliance  with the PAP device.\par The patient is not to use an Ozone or UV sterilizer. \par F/U in 6months\par \par The patient needs reduction in his sedating medications. \par

## 2022-12-15 ENCOUNTER — APPOINTMENT (OUTPATIENT)
Dept: NEUROLOGY | Facility: CLINIC | Age: 82
End: 2022-12-15

## 2023-05-18 ENCOUNTER — APPOINTMENT (OUTPATIENT)
Dept: PULMONOLOGY | Facility: CLINIC | Age: 83
End: 2023-05-18
Payer: MEDICARE

## 2023-05-18 VITALS
RESPIRATION RATE: 14 BRPM | BODY MASS INDEX: 27.77 KG/M2 | HEART RATE: 83 BPM | HEIGHT: 72 IN | DIASTOLIC BLOOD PRESSURE: 70 MMHG | OXYGEN SATURATION: 96 % | SYSTOLIC BLOOD PRESSURE: 130 MMHG | WEIGHT: 205 LBS

## 2023-05-18 DIAGNOSIS — G47.33 OBSTRUCTIVE SLEEP APNEA (ADULT) (PEDIATRIC): ICD-10-CM

## 2023-05-18 PROCEDURE — 99214 OFFICE O/P EST MOD 30 MIN: CPT

## 2023-05-18 RX ORDER — AZELASTINE HYDROCHLORIDE 205.5 UG/1
0.15 SPRAY, METERED NASAL
Qty: 3 | Refills: 3 | Status: COMPLETED | COMMUNITY
End: 2023-05-18

## 2023-05-18 NOTE — REASON FOR VISIT
[Follow-Up] : a follow-up visit [Sleep Apnea] : sleep apnea [TextBox_44] : The patient has weaned off most of his sedating medications except for the Vicodin which she takes at least 2 times a day at minimum.  He is still very tired.  Once he withdrew from the medication he noticed no decrease in function off of the medications but he is still tired.  This is very upsetting to him.  He believes he sleeps reasonably well at night but can fall asleep right after breakfast.  Neurology has suggested that he may need to undergo work-up regarding his sugar.

## 2023-05-18 NOTE — ASSESSMENT
[FreeTextEntry1] : Assessment:\par MARGO\par Obesity\par \par PLAN:\par The patient is benefitting from the PAP device .\par New supplies ordered \par Weight loss discussed\par I stressed the need maintain compliance  with the PAP device.\par The patient is not to use an Ozone or UV sterilizer. \par \par The patient is extremely tired despite coming down off of the majority of the sedating medications.  It is very distressful to the patient.  I will arrange for the patient to repeat nocturnal polysomnography with CPAP to evaluate both the sleep apnea but also to ensure there are no other medical conditions such as PLMS that may be affecting him while he is sleeping.  He should follow-up with his primary regarding any issues of postprandial hyper or hypoglycemia\par \par I reviewed the entire case with the patient's son who was in attendance today.\par \par \par

## 2023-06-20 ENCOUNTER — RX RENEWAL (OUTPATIENT)
Age: 83
End: 2023-06-20

## 2023-06-20 RX ORDER — TAMSULOSIN HYDROCHLORIDE 0.4 MG/1
0.4 CAPSULE ORAL
Qty: 180 | Refills: 3 | Status: ACTIVE | COMMUNITY
Start: 2019-10-01 | End: 1900-01-01

## 2023-06-23 ENCOUNTER — APPOINTMENT (OUTPATIENT)
Dept: NEUROLOGY | Facility: CLINIC | Age: 83
End: 2023-06-23

## 2023-08-18 ENCOUNTER — OUTPATIENT (OUTPATIENT)
Dept: OUTPATIENT SERVICES | Facility: HOSPITAL | Age: 83
LOS: 1 days | Discharge: HOME | End: 2023-08-18
Payer: MEDICARE

## 2023-08-18 ENCOUNTER — APPOINTMENT (OUTPATIENT)
Dept: SLEEP CENTER | Facility: HOSPITAL | Age: 83
End: 2023-08-18
Payer: MEDICARE

## 2023-08-18 DIAGNOSIS — G47.33 OBSTRUCTIVE SLEEP APNEA (ADULT) (PEDIATRIC): ICD-10-CM

## 2023-08-18 PROCEDURE — 95811 POLYSOM 6/>YRS CPAP 4/> PARM: CPT

## 2023-08-18 PROCEDURE — 95811 POLYSOM 6/>YRS CPAP 4/> PARM: CPT | Mod: 26

## 2023-08-22 DIAGNOSIS — G47.33 OBSTRUCTIVE SLEEP APNEA (ADULT) (PEDIATRIC): ICD-10-CM

## 2023-09-20 ENCOUNTER — APPOINTMENT (OUTPATIENT)
Dept: PULMONOLOGY | Facility: CLINIC | Age: 83
End: 2023-09-20
Payer: MEDICARE

## 2023-09-20 DIAGNOSIS — E66.9 OBESITY, UNSPECIFIED: ICD-10-CM

## 2023-09-20 PROCEDURE — 99214 OFFICE O/P EST MOD 30 MIN: CPT | Mod: 95

## 2023-09-20 RX ORDER — PREGABALIN 100 MG/1
100 CAPSULE ORAL TWICE DAILY
Refills: 0 | Status: DISCONTINUED | COMMUNITY
End: 2023-09-20

## 2023-09-25 PROCEDURE — 0: CUSTOM

## 2023-10-17 ENCOUNTER — NON-APPOINTMENT (OUTPATIENT)
Age: 83
End: 2023-10-17

## 2023-10-17 DIAGNOSIS — H35.9 UNSPECIFIED RETINAL DISORDER: ICD-10-CM

## 2023-10-17 DIAGNOSIS — Z91.81 HISTORY OF FALLING: ICD-10-CM

## 2023-10-20 ENCOUNTER — NON-APPOINTMENT (OUTPATIENT)
Age: 83
End: 2023-10-20

## 2023-10-20 RX ORDER — GABAPENTIN 300 MG/1
300 CAPSULE ORAL
Qty: 90 | Refills: 3 | Status: COMPLETED | COMMUNITY
Start: 2022-03-23 | End: 2023-10-20

## 2023-11-29 ENCOUNTER — APPOINTMENT (OUTPATIENT)
Dept: PULMONOLOGY | Facility: CLINIC | Age: 83
End: 2023-11-29
Payer: MEDICARE

## 2023-11-29 DIAGNOSIS — G47.33 OBSTRUCTIVE SLEEP APNEA (ADULT) (PEDIATRIC): ICD-10-CM

## 2023-11-29 DIAGNOSIS — E66.9 OBESITY, UNSPECIFIED: ICD-10-CM

## 2023-11-29 PROCEDURE — 99443: CPT | Mod: 95

## 2024-01-12 ENCOUNTER — APPOINTMENT (OUTPATIENT)
Dept: NEUROLOGY | Facility: CLINIC | Age: 84
End: 2024-01-12
Payer: MEDICARE

## 2024-01-12 VITALS
HEIGHT: 72 IN | RESPIRATION RATE: 15 BRPM | WEIGHT: 195 LBS | SYSTOLIC BLOOD PRESSURE: 142 MMHG | DIASTOLIC BLOOD PRESSURE: 83 MMHG | HEART RATE: 71 BPM | OXYGEN SATURATION: 97 % | BODY MASS INDEX: 26.41 KG/M2

## 2024-01-12 DIAGNOSIS — R41.3 OTHER AMNESIA: ICD-10-CM

## 2024-01-12 DIAGNOSIS — Z86.59 PERSONAL HISTORY OF OTHER MENTAL AND BEHAVIORAL DISORDERS: ICD-10-CM

## 2024-01-12 DIAGNOSIS — R29.898 OTHER SYMPTOMS AND SIGNS INVOLVING THE MUSCULOSKELETAL SYSTEM: ICD-10-CM

## 2024-01-12 DIAGNOSIS — R44.1 VISUAL HALLUCINATIONS: ICD-10-CM

## 2024-01-12 PROCEDURE — 99214 OFFICE O/P EST MOD 30 MIN: CPT

## 2024-01-12 RX ORDER — CYANOCOBALAMIN (VITAMIN B-12) 1000 MCG
1000 TABLET ORAL
Refills: 0 | Status: ACTIVE | COMMUNITY

## 2024-01-12 RX ORDER — LOSARTAN POTASSIUM 100 MG/1
100 TABLET, FILM COATED ORAL
Refills: 0 | Status: DISCONTINUED | COMMUNITY
End: 2024-01-12

## 2024-01-12 RX ORDER — UBIDECARENONE 50 MG
CAPSULE ORAL
Refills: 0 | Status: DISCONTINUED | COMMUNITY
End: 2024-01-12

## 2024-01-12 RX ORDER — HYDROCHLOROTHIAZIDE 12.5 MG/1
12.5 CAPSULE ORAL
Refills: 0 | Status: ACTIVE | COMMUNITY

## 2024-01-12 RX ORDER — DONEPEZIL HYDROCHLORIDE 23 MG/1
23 TABLET, FILM COATED ORAL
Qty: 90 | Refills: 3 | Status: DISCONTINUED | COMMUNITY
Start: 2022-06-16 | End: 2024-01-12

## 2024-01-12 RX ORDER — OMEGA-3/DHA/EPA/FISH OIL 300-1000MG
400 CAPSULE ORAL
Refills: 0 | Status: ACTIVE | COMMUNITY

## 2024-01-12 NOTE — HISTORY OF PRESENT ILLNESS
[FreeTextEntry1] : Still has memory issue. However, no progression since last visit in 7/2023. Occasionally, he has difficulty recalling names. Still lives alone. Children visit him daily.   Monthly visit to pain management for refill Vicodin for pain. Low back pain is most severe upon waking up in mornings and he would take half hydrocodone at 7:30 or so. Around 10:30, he takes the second half of the hydrocodone, and able to function. The rest of the day, he takes another one to 2 tabs of hydrocodone as needed. He lost 40 lbs. The back pain is relatively more bearable. He took a fall and broken some teeth, lead to less intake, resulted in loss of weight.  Since he raised dosage of tizanidine, he no longer has morning cramps of legs. He walks up to half a block before need to rest. Tizanidine at higher dosage is helping him to sleep 6-8 hours a night now. He has the same need to nap after each meal. He tries to nap in hours that is not affecting his quality of daily living.  Persistent numbness is in soles and back of calves. No troubled by some loss of sensation and mild weakness of hands.  Weakness of lower extremities are not worse in feet.  Poor balance upon quick movement. He walks with cane now.  He denies sphincter control abnormality.  Although he was cleared to drive, he stopped driving due to the numbness and weakness of feet. He has bilateral hearing loss and wearing hearing aid regularly. He reports seeing cloud over right eye from macular degeneration. The cloud changes color from black to green. No longer has dizziness.  On lower dosage of B12 supplement. He started Mg supplement on his own.  He had sleep study and diagnosed of MARGO. He uses CPAP regularly. He was also diagnosed of RLS and was recommended to start treatment. Dr. Kevin restarted low dosage of gabapentin. He takes 400mg nightly with reasonable tolerance.  Pertinent history prior to his care here:  By imaging study, he has been diagnosed of grade I spondylolisthesis at L4-5 level, severe stenosis at the same level since 2015. Repeat study in 2018 reported similar findings.  He was treated by PT, epidural steroid injection without benefit. He was prescribed combination of gabapentin and Lyrica for pain control. He feels that the medications had given him excessive side effect of lethargy, without much of help for his pain.  His back pain was depending on up to 3 hydrocodone a day for pain relief. About two months prior to initial visit, he had MILD procedure for L4-5 level and low back pain had not improved after the procedure.

## 2024-01-12 NOTE — DISCUSSION/SUMMARY
[FreeTextEntry1] : Continue same dosage of tizanidine for leg cramps.  Add very low dosage of ropinirole for RLS. If he has better benefit, can decrease gabapentin. Continue other multispecialty care.

## 2024-01-12 NOTE — PHYSICAL EXAM
[FreeTextEntry1] : General Appearance - Well groomed, Not in acute distress. Build & Nutrition - Well nourished.  Integumentary Note:  erythema in distal lower extremities up to mid-calves  Head and Neck Head - normocephalic, atraumatic with no lesions or palpable masses. Neck Global Assessment - no bruit auscultated on the right, no bruit auscultated on the left.  Peripheral Vascular Lower Extremity Palpation - Edema - Bilateral - 2+ Pitting edema - Bilateral.  Neurologic Mental Status Affect - normal. Speech - Normal. Thought content/perception - Normal. Cognitive function - Note: refer to MMN result. Cranial Nerves I Olfactory - Normal. II Optic - Visual fields - Normal. III Oculomotor - Normal Bilaterally. IV Trochlear - Bilateral - Normal - Bilateral. V Trigeminal - Normal Bilaterally. VI Abducens - Bilateral - Normal - Bilateral. VII Facial - Normal Bilaterally. VIII Acoustic - Bilateral - Hearing normal - Bilateral. IX Glossopharyngeal / X Vagus - Normal. XI Accessory - Normal Bilaterally. XII Hypoglossal - Bilateral - Normal - Bilateral. Sensory Light Touch - Decreased - Distal extremities-"stocking-glove" pattern  (up to upper calves) . Pain - Decreased - Distal extremities-"stocking-glove" pattern  (up to upper calves) . Temperature - Decreased - Distal extremities-"stocking-glove" pattern  (up to upper calves) . Vibration - Decreased - Distal extremities-"stocking-glove" pattern  (up to ankles) . Proprioception - Bilateral - Normal - Bilateral. Motor Bulk and Contour - Bulk and Contour - Atrophic - Note: distal lower extremities. Tone - Normal. Strength - 5/5 normal muscle strength - All Muscles  (except graded weak muscles) . 4+/5 reduced muscle strength - Note: left hip flexion, bilateral ankle and toe plantar flexion and ankle dorsiflexion. 0/5 no motion palpable - Note: bilateral EHL. Reflexes (Dermatomes) 0/2 Absent - Right Knee (L2-4), Left Knee (L2-4), Right Achilles (L5-S2) and Left Achilles (L5-S2). 2/2 Normal - Right Bicep (C5-6), Left Bicep (C5-6), Right Brachioradialis (C5-6), Left Brachioradialis (C5-6), Right Triceps (C7-8) and Left Triceps (C7-8). Plantar Reflexes (L4-S2) - Bilateral - Flexion - Bilateral. Coordination - Tandem walking impaired, Walking on toes impaired, Walking on heels impaired and Romberg sign positive. Gait - Broad-based. Frontal Release - Meyerson Sign and Jaw Jerk.  Results of Diagnostic Studies  Labs: Note: 9/23/22 unremarkable CBC, BMP, LFT, lipid profile. 2/9/23 unremarkable CBC, BMP, LFT, CPK. 3/24/23 blood work for polyneuropathy work up only remarkable for low albumin. 6/27/23 glucose 104, normal BMP, LFT, RBC 4.19, MCH 33.2, normal TSH.  Imaging: Imaging - MRI - Note: 2/6/23 brain: reported mild chronic ischemic changes; old right occipital infarct. Images reviewed: the right medial occipital infarct shows gliosis affecting both the cortical and white matter region.   Neurophysiological Testing - Note: 2/22/23 NCS/EMG: severe distal axonal sensory motor polyneuropathy; chronic severe right more active than left L5 and S1 radiculopathy. 4/4/23 EEG: FIRDA.  Neuropsychiatric Mental status exam performed with findings of - no evidence of hallucinations, delusions, obsessions or homicidal/suicidal ideation.  Musculoskeletal Spine/Ribs/Pelvis Cervical Spine - Examination of the cervical spine reveals - no tenderness to palpation, no pain, normal cervical spine movements and normal posture. Thoracic (Dorsal) Spine - Examination of the thoracic spine reveals - no tenderness over thoracic vertebrae, no pain, no paraspinous muscle spasm and normal thoracic spine movements. Lumbosacral Spine - Evaluation of related systems reveals - Straight leg raising negative. Examination of the lumbosacral spine reveals - no tenderness to palpation, no pain, no paraspinous muscle spasm and normal lumbosacral spine movements. Upper Extremity  Ulna: Inspection and Palpation - Tenderness - Tinel sign at cubital tunnel not present. Hand/Wrist: Wrist: Inspection and Palpation - Tenderness - Tinel sign of wrist negative and Phalan sign of wrist negative.

## 2024-04-02 RX ORDER — GABAPENTIN 100 MG/1
100 CAPSULE ORAL
Qty: 270 | Refills: 3 | Status: COMPLETED | COMMUNITY
Start: 2023-10-20 | End: 2024-04-02

## 2024-04-03 RX ORDER — TIZANIDINE 4 MG/1
4 TABLET ORAL
Qty: 90 | Refills: 3 | Status: ACTIVE | COMMUNITY
Start: 1900-01-01 | End: 1900-01-01

## 2024-04-11 RX ORDER — ROPINIROLE 0.5 MG/1
0.5 TABLET, FILM COATED ORAL
Qty: 90 | Refills: 3 | Status: ACTIVE | COMMUNITY
Start: 2024-01-12 | End: 1900-01-01

## 2024-05-09 RX ORDER — GABAPENTIN 300 MG/1
300 CAPSULE ORAL
Qty: 90 | Refills: 3 | Status: DISCONTINUED | COMMUNITY
End: 2024-05-09

## 2024-05-09 RX ORDER — GABAPENTIN 100 MG/1
100 CAPSULE ORAL DAILY
Qty: 90 | Refills: 3 | Status: DISCONTINUED | COMMUNITY
Start: 1900-01-01 | End: 2024-05-09

## 2024-05-09 RX ORDER — GABAPENTIN 300 MG/1
300 CAPSULE ORAL
Qty: 180 | Refills: 3 | Status: ACTIVE | COMMUNITY
Start: 1900-01-01 | End: 1900-01-01

## 2024-05-14 ENCOUNTER — NON-APPOINTMENT (OUTPATIENT)
Age: 84
End: 2024-05-14

## 2024-05-15 ENCOUNTER — APPOINTMENT (OUTPATIENT)
Dept: NEUROLOGY | Facility: CLINIC | Age: 84
End: 2024-05-15
Payer: MEDICARE

## 2024-05-15 VITALS
HEART RATE: 71 BPM | RESPIRATION RATE: 16 BRPM | WEIGHT: 160 LBS | BODY MASS INDEX: 21.67 KG/M2 | DIASTOLIC BLOOD PRESSURE: 78 MMHG | OXYGEN SATURATION: 97 % | HEIGHT: 72 IN | SYSTOLIC BLOOD PRESSURE: 143 MMHG

## 2024-05-15 DIAGNOSIS — I69.30 UNSPECIFIED SEQUELAE OF CEREBRAL INFARCTION: ICD-10-CM

## 2024-05-15 DIAGNOSIS — M54.17 RADICULOPATHY, LUMBOSACRAL REGION: ICD-10-CM

## 2024-05-15 DIAGNOSIS — G47.14 HYPERSOMNIA DUE TO MEDICAL CONDITION: ICD-10-CM

## 2024-05-15 DIAGNOSIS — G62.9 POLYNEUROPATHY, UNSPECIFIED: ICD-10-CM

## 2024-05-15 DIAGNOSIS — M48.00 SPINAL STENOSIS, SITE UNSPECIFIED: ICD-10-CM

## 2024-05-15 DIAGNOSIS — Z86.69 PERSONAL HISTORY OF OTHER DISEASES OF THE NERVOUS SYSTEM AND SENSE ORGANS: ICD-10-CM

## 2024-05-15 DIAGNOSIS — G47.61 PERIODIC LIMB MOVEMENT DISORDER: ICD-10-CM

## 2024-05-15 DIAGNOSIS — Z87.898 PERSONAL HISTORY OF OTHER SPECIFIED CONDITIONS: ICD-10-CM

## 2024-05-15 PROCEDURE — 99214 OFFICE O/P EST MOD 30 MIN: CPT

## 2024-05-15 PROCEDURE — G2211 COMPLEX E/M VISIT ADD ON: CPT

## 2024-05-15 RX ORDER — GABAPENTIN 100 MG/1
100 CAPSULE ORAL
Refills: 0 | Status: ACTIVE | COMMUNITY

## 2024-05-15 NOTE — DISCUSSION/SUMMARY
[FreeTextEntry1] : Since he gets better in control of RLS and other symptoms of polyneuropathy. No worsening of pain in lumbosacral region, will try to hold gabapentin. Start to hold 300mg capsule. At the present time, he ran out of supply of 100mg capsule. If he wants to resume 100mg, will resume. Hope his complaints of balance, generalized weakness and cognitive symptoms have noticeable improvement without gabapentin.

## 2024-05-15 NOTE — PHYSICAL EXAM
[FreeTextEntry1] : General Appearance - Well groomed, Not in acute distress. Build & Nutrition - Well nourished.  Integumentary Note: erythema in distal lower extremities up to mid-calves  Head and Neck Head - normocephalic, atraumatic with no lesions or palpable masses. Neck Global Assessment - no bruit auscultated on the right, no bruit auscultated on the left.  Peripheral Vascular Lower Extremity Palpation - Edema - Bilateral - 2+ Pitting edema - Bilateral.  Neurologic Mental Status Affect - normal. Speech - Normal. Thought content/perception - Normal. Cognitive function - Note: refer to MMN result. Cranial Nerves I Olfactory - Normal. II Optic - Visual fields - Normal. III Oculomotor - Normal Bilaterally. IV Trochlear - Bilateral - Normal - Bilateral. V Trigeminal - Normal Bilaterally. VI Abducens - Bilateral - Normal - Bilateral. VII Facial - Normal Bilaterally. VIII Acoustic - Bilateral - Hearing normal - Bilateral. IX Glossopharyngeal / X Vagus - Normal. XI Accessory - Normal Bilaterally. XII Hypoglossal - Bilateral - Normal - Bilateral. Sensory Light Touch - Decreased - Distal extremities-"stocking-glove" pattern (up to upper calves). Pain - Decreased - Distal extremities-"stocking-glove" pattern (up to upper calves). Temperature - Decreased - Distal extremities-"stocking-glove" pattern (up to upper calves). Vibration - Decreased - Distal extremities-"stocking-glove" pattern (up to ankles). Proprioception - Bilateral - Normal - Bilateral. Motor Bulk and Contour - Bulk and Contour - Atrophic - Note: distal lower extremities. Tone - Normal. Strength - 5/5 normal muscle strength - All Muscles (except graded weak muscles). 4+/5 reduced muscle strength - Note: left hip flexion, bilateral ankle and toe plantar flexion and ankle dorsiflexion. 0/5 no motion palpable - Note: bilateral EHL. Reflexes (Dermatomes) 0/2 Absent - Right Knee (L2-4), Left Knee (L2-4), Right Achilles (L5-S2) and Left Achilles (L5-S2). 2/2 Normal - Right Bicep (C5-6), Left Bicep (C5-6), Right Brachioradialis (C5-6), Left Brachioradialis (C5-6), Right Triceps (C7-8) and Left Triceps (C7-8). Plantar Reflexes (L4-S2) - Bilateral - Flexion - Bilateral. Coordination - Tandem walking impaired, Walking on toes impaired, Walking on heels impaired and Romberg sign positive. Gait - Broad-based. Frontal Release - Meyerson Sign and Jaw Jerk.  Results of Diagnostic Studies  Labs: Note: 9/23/22 unremarkable CBC, BMP, LFT, lipid profile. 2/9/23 unremarkable CBC, BMP, LFT, CPK. 3/24/23 blood work for polyneuropathy work up only remarkable for low albumin. 6/27/23 glucose 104, normal BMP, LFT, RBC 4.19, MCH 33.2, normal TSH. 1/8/24 normal CBC.  Imaging: Imaging - MRI - Note: 2/6/23 brain: reported mild chronic ischemic changes; old right occipital infarct. Images reviewed: the right medial occipital infarct shows gliosis affecting both the cortical and white matter region.  Neurophysiological Testing - Note: 2/22/23 NCS/EMG: severe distal axonal sensory motor polyneuropathy; chronic severe right more active than left L5 and S1 radiculopathy. 4/4/23 EEG: FIRDA.  Neuropsychiatric Mental status exam performed with findings of - no evidence of hallucinations, delusions, obsessions or homicidal/suicidal ideation.

## 2024-05-15 NOTE — HISTORY OF PRESENT ILLNESS
[FreeTextEntry1] : Since he started ropinirole, he has less leg cramps. Combined with tizanidine, he is able to sleep through the night. He also wears compression stockings.  Still has memory issue. Occasionally, he has difficulty recalling names. Still lives alone. Children visit him daily.  Monthly visit to pain management for refill Vicodin for pain. Low back pain is most severe upon waking up in mornings and he would take half hydrocodone at 7:30 or so. Around 10:30, he takes the second half of the hydrocodone, and able to function. The rest of the day, he takes another one to 2 tabs of hydrocodone as needed. He lost 40 lbs. The back pain is relatively more bearable.   Persistent numbness is in soles and back of calves. No troubled by some loss of sensation and mild weakness of hands. He notices more balance control difficulty. Overall feels weaker all over. However, mostly in legs. However, he is still able to walk up to 2 miles a day. Half a block each time, assisted by cane.  He denies sphincter control abnormality.  Although he was cleared to drive, he stopped driving due to the numbness and weakness of feet. He has bilateral hearing loss and wearing hearing aid regularly. He reports seeing cloud over right eye from macular degeneration. The cloud changes color from black to red now. No longer has dizziness.  On lower dosage of B12 supplement. He started Mg supplement on his own.  He had sleep study and diagnosed of MARGO. He uses CPAP regularly. Maintains on gabapentin 400mg, nightly.  Pertinent history prior to his care here:  By imaging study, he has been diagnosed of grade I spondylolisthesis at L4-5 level, severe stenosis at the same level since 2015. Repeat study in 2018 reported similar findings.  He was treated by PT, epidural steroid injection without benefit. He was prescribed combination of gabapentin and Lyrica for pain control. He feels that the medications had given him excessive side effect of lethargy, without much of help for his pain.  His back pain was depending on up to 3 hydrocodone a day for pain relief. About two months prior to initial visit, he had MILD procedure for L4-5 level and low back pain had not improved after the procedure.

## 2024-10-02 ENCOUNTER — APPOINTMENT (OUTPATIENT)
Dept: NEUROLOGY | Facility: CLINIC | Age: 84
End: 2024-10-02
Payer: MEDICARE

## 2024-10-02 DIAGNOSIS — G62.9 POLYNEUROPATHY, UNSPECIFIED: ICD-10-CM

## 2024-10-02 DIAGNOSIS — M54.17 RADICULOPATHY, LUMBOSACRAL REGION: ICD-10-CM

## 2024-10-02 PROCEDURE — 99441: CPT | Mod: 93
